# Patient Record
Sex: FEMALE | Race: WHITE
[De-identification: names, ages, dates, MRNs, and addresses within clinical notes are randomized per-mention and may not be internally consistent; named-entity substitution may affect disease eponyms.]

---

## 2019-11-29 ENCOUNTER — HOSPITAL ENCOUNTER (EMERGENCY)
Dept: HOSPITAL 41 - JD.ED | Age: 44
Discharge: HOME | End: 2019-11-29
Payer: COMMERCIAL

## 2019-11-29 DIAGNOSIS — S09.90XA: Primary | ICD-10-CM
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
## 2019-11-29 DIAGNOSIS — Z91.040: ICD-10-CM

## 2019-11-29 DIAGNOSIS — S50.02XA: ICD-10-CM

## 2019-11-29 DIAGNOSIS — F48.8: ICD-10-CM

## 2019-11-29 NOTE — CT
Head CT

 

Technique: Multiple axial sections through the brain were obtained.  

Intravenous contrast was not utilized.

 

Comparison: No previous intracranial imaging.

 

Findings: Ventricles along with basal cisterns and sulci over 

convexities are within normal limits for the patient's age.  No 

abnormal parenchymal densities are seen.  No evidence of intracranial 

hemorrhage.  No midline shift or mass effect is seen.

 

Bone window settings were reviewed.  No acute calvarial abnormality is

 seen.  Visualized mastoid sinuses and paranasal sinuses show nothing 

acute.

 

Impression:

1.  Nothing acute is appreciated on noncontrast head CT exam.

 

Diagnostic code #1

 

This report was dictated in [place] [employer] Time

## 2019-11-29 NOTE — EDM.PDOC
ED HPI GENERAL MEDICAL PROBLEM





- General


Chief Complaint: Trauma


Stated Complaint: FALL


Time Seen by Provider: 11/29/19 11:46


Source of Information: Reports: Patient, Family


History Limitations: Reports: No Limitations





- History of Present Illness


INITIAL COMMENTS - FREE TEXT/NARRATIVE: 





The patient presents with a headache and left elbow pain after a fall.  The 

patient was at [employer] and she slipped on the ice and fell and hit her head, 

hurt her left elbow and has pain to the left buttock.  She did hit her head.  

She had no LOC.  She went to her truck with her  and she passed out for 

about 5 minutes.  She did wet herself but there was no seizure activity.  She 

thinks she passed out because of the pain from the left elbow.  She has a 

slight headache.  Her left elbow hurts.  She has mild pain to the left buttock.

  She has no chest pain, shortness of breath, abdominal pain, nausea or 

vomiting.


Onset: Sudden


Duration: Minutes:


Location: Reports: Head, Upper Extremity, Left (elbow), Lower Extremity, Left (

buttock)


Quality: Reports: Sharp


Severity: Moderate


Improves with: Reports: Immobilization


Worsens with: Reports: Movement


Context: Reports: Trauma (Slipped and fell on the ice)


Associated Symptoms: Reports: Headaches.  Denies: Chest Pain, Cough, Fever/

Chills, Nausea/Vomiting, Shortness of Breath


  ** Elbow


Pain Score (Numeric/FACES): 7





- Related Data


 Allergies











Allergy/AdvReac Type Severity Reaction Status Date / Time


 


latex Allergy  Anaphylactic Verified 11/29/19 11:52





   Shock  











Home Meds: 


 Home Meds





Levothyroxine Sodium [Synthroid] 25 mcg PO ACBREAKFAST 11/29/19 [History]


Sertraline [Zoloft] 50 mg PO BEDTIME 11/29/19 [History]











Review of Systems





- Review of Systems


Review Of Systems: See Below


Constitutional: Reports: No Symptoms


Eyes: Reports: No Symptoms


Ears: Reports: No Symptoms


Nose: Reports: No Symptoms


Mouth/Throat: Reports: No Symptoms


Respiratory: Reports: No Symptoms


Cardiovascular: Reports: Syncope.  Denies: Chest Pain


GI/Abdominal: Reports: No Symptoms


Genitourinary: Reports: No Symptoms


Musculoskeletal: Reports: Other (Left elbow pain and left buttock pain)


Neurological: Reports: Headache





ED EXAM, GENERAL





- Physical Exam


Exam: See Below


Exam Limited By: No Limitations


General Appearance: Alert, No Apparent Distress


Ears: Normal External Exam


Nose: Normal Inspection


Head: Other (Mild tenderness to the occipital region)


Neck: Normal Inspection, Supple, Non-Tender


Respiratory/Chest: No Respiratory Distress, Lungs Clear, Normal Breath Sounds


Cardiovascular: Regular Rate, Rhythm, No Edema, No Murmur


GI/Abdominal: Soft, Non-Tender, No Organomegaly, No Mass


Back Exam: Normal Inspection


Extremities: Other (Pain upon palpation to the left elbow with no edema.  Good 

sensation and pulses distally)


Neurological: Alert, Oriented, No Motor/Sensory Deficits





EKG INTERPRETATION


EKG Date: 11/29/19


Time: 12:09


Rhythm: NSR


Rate (Beats/Min): 86


Axis: Normal


P-Wave: Present


QRS: Normal


ST-T: Normal


QT: Normal





Course





- Vital Signs


Last Recorded V/S: 


 Last Vital Signs











Temp  96.8 F   11/29/19 11:47


 


Pulse  97   11/29/19 11:47


 


Resp  20   11/29/19 11:47


 


BP  112/66   11/29/19 11:47


 


Pulse Ox  100   11/29/19 11:47














- Orders/Labs/Meds


Orders: 


 Active Orders 24 hr











 Category Date Time Status


 


 Cardiac Monitoring [RC] .AS DIRECTED Care  11/29/19 11:47 Active


 


 EKG Documentation Completion [RC] STAT Care  11/29/19 11:48 Active


 


 Elbow Min 3V Lt [CR] Stat Exams  11/29/19 11:49 Taken











Labs: 


 Laboratory Tests











  11/29/19 11/29/19 Range/Units





  12:15 12:15 


 


WBC  9.41   (3.98-10.04)  K/mm3


 


RBC  4.35   (3.98-5.22)  M/mm3


 


Hgb  13.0   (11.2-15.7)  gm/dl


 


Hct  39.2   (34.1-44.9)  %


 


MCV  90.1   (79.4-94.8)  fl


 


MCH  29.9   (25.6-32.2)  pg


 


MCHC  33.2   (32.2-35.5)  g/dl


 


RDW Std Deviation  42.5   (36.4-46.3)  fL


 


Plt Count  286   (182-369)  K/mm3


 


MPV  9.3 L   (9.4-12.3)  fl


 


Neut % (Auto)  58.1   (34.0-71.1)  %


 


Lymph % (Auto)  34.4   (19.3-51.7)  %


 


Mono % (Auto)  7.0   (4.7-12.5)  %


 


Eos % (Auto)  0.2 L   (0.7-5.8)  


 


Baso % (Auto)  0.2   (0.1-1.2)  %


 


Neut # (Auto)  5.46   (1.56-6.13)  K/mm3


 


Lymph # (Auto)  3.24   (1.18-3.74)  K/mm3


 


Mono # (Auto)  0.66 H   (0.24-0.36)  K/mm3


 


Eos # (Auto)  0.02 L   (0.04-0.36)  K/mm3


 


Baso # (Auto)  0.02   (0.01-0.08)  K/mm3


 


Sodium   135 L  (136-145)  mEq/L


 


Potassium   3.6  (3.5-5.1)  mEq/L


 


Chloride   101  ()  mEq/L


 


Carbon Dioxide   22  (21-32)  mEq/L


 


Anion Gap   15.6 H  (5-15)  


 


BUN   22 H  (7-18)  mg/dL


 


Creatinine   0.9  (0.55-1.02)  mg/dL


 


Est Cr Clr Drug Dosing   64.61  mL/min


 


Estimated GFR (MDRD)   > 60  (>60)  mL/min


 


BUN/Creatinine Ratio   24.4 H  (14-18)  


 


Glucose   93  ()  mg/dL


 


Calcium   9.0  (8.5-10.1)  mg/dL


 


Total Bilirubin   0.5  (0.2-1.0)  mg/dL


 


AST   24  (15-37)  U/L


 


ALT   36  (14-59)  U/L


 


Alkaline Phosphatase   89  ()  U/L


 


Troponin I   < 0.017  (0.00-0.056)  ng/mL


 


Total Protein   7.7  (6.4-8.2)  g/dl


 


Albumin   3.6  (3.4-5.0)  g/dl


 


Globulin   4.1  gm/dL


 


Albumin/Globulin Ratio   0.9 L  (1-2)  














- Re-Assessments/Exams


Free Text/Narrative Re-Assessment/Exam: 





11/29/19 11:58


I ordered a CT of her head, EKG, labs, and an x-ray of her elbow.


11/29/19 13:08


Her EKG shows a NSR with no acute changes.  Her x-ray of her elbow shows no 

fractures.  Her CT looks good and her labs look good.  I will discharge her 

home.





Departure





- Departure


Time of Disposition: 13:10


Disposition: Home, Self-Care 01


Condition: Good


Clinical Impression: 


Fall


Qualifiers:


 Encounter type: initial encounter Qualified Code(s): W19.XXXA - Unspecified 

fall, initial encounter





Head injury


Qualifiers:


 Encounter type: initial encounter Qualified Code(s): S09.90XA - Unspecified 

injury of head, initial encounter





Contusion of left elbow


Qualifiers:


 Encounter type: initial encounter Qualified Code(s): S50.02XA - Contusion of 

left elbow, initial encounter





Syncope


Qualifiers:


 Syncope type: psychogenic syncope Qualified Code(s): F48.8 - Other specified 

nonpsychotic mental disorders








- Discharge Information


*PRESCRIPTION DRUG MONITORING PROGRAM REVIEWED*: Not Applicable


*COPY OF PRESCRIPTION DRUG MONITORING REPORT IN PATIENT AMINATA: Not Applicable


Referrals: 


Francine Person MD [Primary Care Provider] - 1 Week


Forms:  ED Department Discharge


Additional Instructions: 


Take tylenol or motrin for any pain.  Ice the areas that hurt for 15 minutes 3 

times per day for 2 days.  Please return if you are worse.





- My Orders


Last 24 Hours: 


My Active Orders





11/29/19 11:47


Cardiac Monitoring [RC] .AS DIRECTED 





11/29/19 11:48


EKG Documentation Completion [RC] STAT 





11/29/19 11:49


Elbow Min 3V Lt [CR] Stat 














- Assessment/Plan


Last 24 Hours: 


My Active Orders





11/29/19 11:47


Cardiac Monitoring [RC] .AS DIRECTED 





11/29/19 11:48


EKG Documentation Completion [RC] STAT 





11/29/19 11:49


Elbow Min 3V Lt [CR] Stat

## 2019-12-02 NOTE — CR
Left elbow: Four views of the left elbow were obtained.

 

Comparison: No prior elbow study.

 

No joint effusion is seen.  Joint spaces are preserved.  No joint 

effusion is seen.  No fracture or other bony abnormality is seen.

 

Impression:

1.  No abnormality is appreciated on left elbow study.

 

Diagnostic code #1

 

This report was dictated in [place] Standard Time